# Patient Record
Sex: FEMALE | Race: WHITE | NOT HISPANIC OR LATINO
[De-identification: names, ages, dates, MRNs, and addresses within clinical notes are randomized per-mention and may not be internally consistent; named-entity substitution may affect disease eponyms.]

---

## 2024-09-04 PROBLEM — Z00.00 ENCOUNTER FOR PREVENTIVE HEALTH EXAMINATION: Status: ACTIVE | Noted: 2024-09-04

## 2024-09-05 ENCOUNTER — APPOINTMENT (OUTPATIENT)
Dept: OTOLARYNGOLOGY | Facility: CLINIC | Age: 79
End: 2024-09-05
Payer: MEDICARE

## 2024-09-05 ENCOUNTER — NON-APPOINTMENT (OUTPATIENT)
Age: 79
End: 2024-09-05

## 2024-09-05 VITALS
HEART RATE: 73 BPM | BODY MASS INDEX: 36.05 KG/M2 | DIASTOLIC BLOOD PRESSURE: 83 MMHG | SYSTOLIC BLOOD PRESSURE: 139 MMHG | WEIGHT: 188.5 LBS | HEIGHT: 60.63 IN

## 2024-09-05 DIAGNOSIS — Z86.39 PERSONAL HISTORY OF OTHER ENDOCRINE, NUTRITIONAL AND METABOLIC DISEASE: ICD-10-CM

## 2024-09-05 DIAGNOSIS — Z78.9 OTHER SPECIFIED HEALTH STATUS: ICD-10-CM

## 2024-09-05 DIAGNOSIS — B36.9 SUPERFICIAL MYCOSIS, UNSPECIFIED: ICD-10-CM

## 2024-09-05 DIAGNOSIS — Z86.79 PERSONAL HISTORY OF OTHER DISEASES OF THE CIRCULATORY SYSTEM: ICD-10-CM

## 2024-09-05 DIAGNOSIS — H62.43 SUPERFICIAL MYCOSIS, UNSPECIFIED: ICD-10-CM

## 2024-09-05 PROCEDURE — 99203 OFFICE O/P NEW LOW 30 MIN: CPT

## 2024-09-05 RX ORDER — METFORMIN HYDROCHLORIDE 625 MG/1
TABLET ORAL
Refills: 0 | Status: DISCONTINUED | COMMUNITY
End: 2024-09-05

## 2024-09-05 RX ORDER — ASPIRIN ENTERIC COATED TABLETS 81 MG 81 MG/1
81 TABLET, DELAYED RELEASE ORAL
Refills: 0 | Status: ACTIVE | COMMUNITY

## 2024-09-05 RX ORDER — OFLOXACIN OTIC 3 MG/ML
0.3 SOLUTION AURICULAR (OTIC)
Refills: 0 | Status: ACTIVE | COMMUNITY

## 2024-09-05 RX ORDER — SIMVASTATIN 80 MG/1
TABLET, FILM COATED ORAL
Refills: 0 | Status: ACTIVE | COMMUNITY

## 2024-09-05 NOTE — HISTORY OF PRESENT ILLNESS
Chief Complaint   Patient presents with   Lilliam Skinnerer Annual Wellness Visit     1. Have you been to the ER, urgent care clinic since your last visit? Hospitalized since your last visit? Yes When: 7/13/2017 Where: [facility] Reason for visit: UTI    2. Have you seen or consulted any other health care providers outside of the [address] [place] [address] since your last visit? Include any pap smears or colon screening.  No [de-identified] : 79 yr old female c/o intermittent discharge AS then AD since April UC rx ofloxacin with temporary relief then drainage and itch recurred  -hx otitis, head trauma, FH +noise exp (factory that made vicki)

## 2024-09-05 NOTE — PHYSICAL EXAM
[Normal] : mucosa is normal [Midline] : trachea located in midline position [de-identified] : pus w fungus bilat

## 2024-09-05 NOTE — REVIEW OF SYSTEMS
[Ear Pain] : ear pain [Ear Itch] : ear itch [Recurrent Ear Infections] : recurrent ear infections [Nasal Congestion] : nasal congestion [Cough] : cough [Negative] : Heme/Lymph

## 2024-09-15 ENCOUNTER — NON-APPOINTMENT (OUTPATIENT)
Age: 79
End: 2024-09-15

## 2024-09-16 ENCOUNTER — APPOINTMENT (OUTPATIENT)
Dept: OTOLARYNGOLOGY | Facility: CLINIC | Age: 79
End: 2024-09-16
Payer: MEDICARE

## 2024-09-16 VITALS
WEIGHT: 188.5 LBS | SYSTOLIC BLOOD PRESSURE: 147 MMHG | HEART RATE: 7 BPM | BODY MASS INDEX: 36.05 KG/M2 | DIASTOLIC BLOOD PRESSURE: 79 MMHG | HEIGHT: 60.63 IN

## 2024-09-16 DIAGNOSIS — B36.9 SUPERFICIAL MYCOSIS, UNSPECIFIED: ICD-10-CM

## 2024-09-16 DIAGNOSIS — H62.43 SUPERFICIAL MYCOSIS, UNSPECIFIED: ICD-10-CM

## 2024-09-16 PROCEDURE — 99213 OFFICE O/P EST LOW 20 MIN: CPT

## 2024-09-16 NOTE — PHYSICAL EXAM
[de-identified] : old powder suctioned out, no fungus seen [Normal] : mucosa is normal [Midline] : trachea located in midline position

## 2024-09-16 NOTE — HISTORY OF PRESENT ILLNESS
[de-identified] : 79 yr old female c/o intermittent discharge AS then AD since April UC rx ofloxacin with temporary relief then drainage and itch recurred  9/5 tx w boric acid powder for otomycosis  AU still has some itch  -hx otitis, head trauma, FH +noise exp (factory that made vicki)

## 2024-10-07 ENCOUNTER — APPOINTMENT (OUTPATIENT)
Dept: OTOLARYNGOLOGY | Facility: CLINIC | Age: 79
End: 2024-10-07
Payer: MEDICARE

## 2024-10-07 VITALS
HEART RATE: 67 BPM | HEIGHT: 63 IN | BODY MASS INDEX: 31.54 KG/M2 | WEIGHT: 178 LBS | DIASTOLIC BLOOD PRESSURE: 82 MMHG | SYSTOLIC BLOOD PRESSURE: 143 MMHG

## 2024-10-07 DIAGNOSIS — B36.9 SUPERFICIAL MYCOSIS, UNSPECIFIED: ICD-10-CM

## 2024-10-07 DIAGNOSIS — H62.43 SUPERFICIAL MYCOSIS, UNSPECIFIED: ICD-10-CM

## 2024-10-07 PROCEDURE — 99213 OFFICE O/P EST LOW 20 MIN: CPT

## 2024-10-07 NOTE — HISTORY OF PRESENT ILLNESS
[de-identified] : 79 yr old female c/o intermittent discharge AS then AD since April UC rx ofloxacin with temporary relief then drainage and itch recurred  9/5 and 9/16 tx w boric acid powder for otomycosis  AU no more itch or pain hears a swishing sound AS  -hx otitis, head trauma, FH +noise exp (factory that made alia)

## 2024-10-07 NOTE — PHYSICAL EXAM
[Normal] : mucosa is normal [Midline] : trachea located in midline position [de-identified] : old powder suctioned out AS, no fungus seen

## 2024-12-02 ENCOUNTER — APPOINTMENT (OUTPATIENT)
Dept: OTOLARYNGOLOGY | Facility: CLINIC | Age: 79
End: 2024-12-02

## 2025-01-13 ENCOUNTER — APPOINTMENT (OUTPATIENT)
Dept: OTOLARYNGOLOGY | Facility: CLINIC | Age: 80
End: 2025-01-13
Payer: MEDICARE

## 2025-01-13 DIAGNOSIS — H62.43 SUPERFICIAL MYCOSIS, UNSPECIFIED: ICD-10-CM

## 2025-01-13 DIAGNOSIS — B36.9 SUPERFICIAL MYCOSIS, UNSPECIFIED: ICD-10-CM

## 2025-01-13 PROCEDURE — 99213 OFFICE O/P EST LOW 20 MIN: CPT

## 2025-01-13 NOTE — PHYSICAL EXAM
[de-identified] : fungus medially AU w pus AS [de-identified] : clear AD, erythema AS [Normal] : mucosa is normal [Midline] : trachea located in midline position

## 2025-01-13 NOTE — HISTORY OF PRESENT ILLNESS
[de-identified] : 79 yr old female c/o intermittent discharge AS then AD since April 2024 UC rx ofloxacin with temporary relief then drainage and itch recurred  9/5 and 9/16 tx w boric acid powder for otomycosis  AU c/o itch and otorrhea since late Dec derm rx lotrimin cream without relief  -hx otitis, head trauma, FH +noise exp (factory that made alia)

## 2025-01-27 ENCOUNTER — NON-APPOINTMENT (OUTPATIENT)
Age: 80
End: 2025-01-27

## 2025-01-27 ENCOUNTER — APPOINTMENT (OUTPATIENT)
Dept: OTOLARYNGOLOGY | Facility: CLINIC | Age: 80
End: 2025-01-27
Payer: MEDICARE

## 2025-01-27 VITALS
WEIGHT: 178 LBS | BODY MASS INDEX: 30.39 KG/M2 | SYSTOLIC BLOOD PRESSURE: 129 MMHG | DIASTOLIC BLOOD PRESSURE: 77 MMHG | HEIGHT: 64 IN | HEART RATE: 70 BPM

## 2025-01-27 DIAGNOSIS — H62.43 SUPERFICIAL MYCOSIS, UNSPECIFIED: ICD-10-CM

## 2025-01-27 DIAGNOSIS — B36.9 SUPERFICIAL MYCOSIS, UNSPECIFIED: ICD-10-CM

## 2025-01-27 PROCEDURE — 99213 OFFICE O/P EST LOW 20 MIN: CPT

## 2025-01-27 NOTE — HISTORY OF PRESENT ILLNESS
[de-identified] : 79 yr old female c/o intermittent discharge AS then AD since April 2024 UC rx ofloxacin with temporary relief then drainage and itch recurred  9/5 and 9/16 tx w boric acid powder for otomycosis  AU c/o itch and otorrhea since late Dec derm rx lotrimin cream without relief  another episode of otomycosis tx w boric acid powder on 1/13 feels better except for tinnitus when she puts her ear on the pillow  -hx otitis, head trauma, FH +noise exp (factory that made alia)

## 2025-06-16 ENCOUNTER — APPOINTMENT (OUTPATIENT)
Dept: OTOLARYNGOLOGY | Facility: CLINIC | Age: 80
End: 2025-06-16
Payer: MEDICARE

## 2025-06-16 VITALS
WEIGHT: 178 LBS | BODY MASS INDEX: 30.39 KG/M2 | HEIGHT: 64 IN | DIASTOLIC BLOOD PRESSURE: 70 MMHG | SYSTOLIC BLOOD PRESSURE: 145 MMHG | HEART RATE: 60 BPM

## 2025-06-16 PROCEDURE — 99213 OFFICE O/P EST LOW 20 MIN: CPT

## 2025-06-23 ENCOUNTER — APPOINTMENT (OUTPATIENT)
Dept: OTOLARYNGOLOGY | Facility: CLINIC | Age: 80
End: 2025-06-23
Payer: MEDICARE

## 2025-06-23 VITALS
HEART RATE: 69 BPM | SYSTOLIC BLOOD PRESSURE: 147 MMHG | WEIGHT: 182 LBS | HEIGHT: 64 IN | BODY MASS INDEX: 31.07 KG/M2 | DIASTOLIC BLOOD PRESSURE: 75 MMHG

## 2025-06-23 PROCEDURE — 99213 OFFICE O/P EST LOW 20 MIN: CPT

## 2025-06-23 NOTE — HISTORY OF PRESENT ILLNESS
[de-identified] : 80 yr old female c/o black drainage AU w clog and pressure for a month +hx otomycosis -DM  Had boirc acid powder instilled AU 6/16 feels a bit better

## 2025-06-23 NOTE — PHYSICAL EXAM
[Normal] : mucosa is normal [Midline] : trachea located in midline position [de-identified] : old powder and min fungus suctioned out

## 2025-06-30 ENCOUNTER — APPOINTMENT (OUTPATIENT)
Dept: OTOLARYNGOLOGY | Facility: CLINIC | Age: 80
End: 2025-06-30
Payer: MEDICARE

## 2025-06-30 VITALS
WEIGHT: 182 LBS | SYSTOLIC BLOOD PRESSURE: 155 MMHG | HEIGHT: 64 IN | BODY MASS INDEX: 31.07 KG/M2 | DIASTOLIC BLOOD PRESSURE: 76 MMHG | HEART RATE: 66 BPM

## 2025-06-30 PROCEDURE — 99213 OFFICE O/P EST LOW 20 MIN: CPT

## 2025-07-07 ENCOUNTER — APPOINTMENT (OUTPATIENT)
Dept: OTOLARYNGOLOGY | Facility: CLINIC | Age: 80
End: 2025-07-07
Payer: MEDICARE

## 2025-07-07 VITALS
DIASTOLIC BLOOD PRESSURE: 77 MMHG | SYSTOLIC BLOOD PRESSURE: 138 MMHG | WEIGHT: 182 LBS | HEART RATE: 80 BPM | HEIGHT: 64 IN | BODY MASS INDEX: 31.07 KG/M2

## 2025-07-07 PROCEDURE — 99213 OFFICE O/P EST LOW 20 MIN: CPT

## 2025-07-07 NOTE — PHYSICAL EXAM
[FreeTextEntry8] : old boric powder suctioned, no signs of infection [FreeTextEntry9] : old boric powder suctioned, no signs of infection

## 2025-07-07 NOTE — CONSULT LETTER
[Dear  ___] : Dear  [unfilled], [Courtesy Letter:] : I had the pleasure of seeing your patient, [unfilled], in my office today. [Please see my note below.] : Please see my note below. [Referral Closing:] : Thank you very much for seeing this patient.  If you have any questions, please do not hesitate to contact me. [Sincerely,] : Sincerely, [FreeTextEntry3] : Vincent Santos PA-C

## 2025-07-07 NOTE — HISTORY OF PRESENT ILLNESS
[de-identified] : Patient hx of otomycosis has been following with Dr. Hernandez due to dark colored drainage from both ears. Patient had boric powder instilled in ear 6/16/, 6/23, 6/30. She denies otalgia, clogged ear sensation, hasnt noticed any drainage from the ears.  Has hx of diabetes on metformin 
Yes

## 2025-07-07 NOTE — ASSESSMENT
[FreeTextEntry1] : Reviewed and reconciled medications, allergies, PMHx, PSHx, SocHx, FMHx.   physical exam: right ear: powder suctioned out no fungus seen left ear: powder suctioned out no fungus seen  Inflamed turbinates     Plan: Try to keep ears dry Follow up after returns from trip to [facility]     Case discussed with Dr. Cordero